# Patient Record
Sex: FEMALE | Race: WHITE
[De-identification: names, ages, dates, MRNs, and addresses within clinical notes are randomized per-mention and may not be internally consistent; named-entity substitution may affect disease eponyms.]

---

## 2021-09-25 ENCOUNTER — HOSPITAL ENCOUNTER (EMERGENCY)
Dept: HOSPITAL 4 - SED | Age: 36
Discharge: HOME | End: 2021-09-25
Payer: MEDICAID

## 2021-09-25 VITALS — HEIGHT: 61 IN | WEIGHT: 143 LBS | BODY MASS INDEX: 27 KG/M2

## 2021-09-25 VITALS — SYSTOLIC BLOOD PRESSURE: 132 MMHG

## 2021-09-25 VITALS — SYSTOLIC BLOOD PRESSURE: 106 MMHG

## 2021-09-25 DIAGNOSIS — O03.9: Primary | ICD-10-CM

## 2021-09-25 DIAGNOSIS — D25.9: ICD-10-CM

## 2021-09-25 LAB
BASOPHILS # BLD AUTO: 0.1 K/UL (ref 0–0.2)
BASOPHILS NFR BLD AUTO: 0.7 % (ref 0–2)
EOSINOPHIL # BLD AUTO: 0.1 K/UL (ref 0–0.4)
EOSINOPHIL NFR BLD AUTO: 1.2 % (ref 0–4)
ERYTHROCYTE [DISTWIDTH] IN BLOOD BY AUTOMATED COUNT: 13.8 % (ref 9–15)
HCT VFR BLD AUTO: 39.4 % (ref 36–48)
HGB BLD-MCNC: 13.4 G/DL (ref 12–16)
INR PPP: 0.9 (ref 0.8–1.2)
LYMPHOCYTES # BLD AUTO: 3.3 K/UL (ref 1–5.5)
LYMPHOCYTES NFR BLD AUTO: 34.8 % (ref 20.5–51.5)
MCH RBC QN AUTO: 30 PG (ref 27–31)
MCHC RBC AUTO-ENTMCNC: 34 % (ref 32–36)
MCV RBC AUTO: 88 FL (ref 79–98)
MONOCYTES # BLD MANUAL: 0.7 K/UL (ref 0–1)
MONOCYTES # BLD MANUAL: 7.1 % (ref 1.7–9.3)
NEUTROPHILS # BLD AUTO: 5.3 K/UL (ref 1.8–7.7)
NEUTROPHILS NFR BLD AUTO: 56.2 % (ref 40–70)
PLATELET # BLD AUTO: 350 K/UL (ref 130–430)
PROTHROMBIN TIME: 9.5 SECS (ref 9.5–12.5)
RBC # BLD AUTO: 4.49 MIL/UL (ref 4.2–6.2)
WBC # BLD AUTO: 9.4 K/UL (ref 4.8–10.8)

## 2021-09-25 NOTE — NUR
Patient given written and verbal discharge instructions and verbalizes 
understanding.  ER MD discussed with patient the results and treatment 
provided. Patient in stable condition. ID arm band removed.

No Rx   given. Patient educated on pain management and to follow up with PMD. 
Pain scale 0/10.

Opportunity for questions provided and answered. Medication side effect fact 
sheet provided.

## 2021-09-25 NOTE — NUR
Pt. bib  with c/o vaginal bleeding yesterday and again today, states 
blood bright red, denies any pain or cramping. Bleeding small amount not 
requiring use of pad.  Per pt. , 1   and 1 ectopic  with an EDC 
of 2022.